# Patient Record
(demographics unavailable — no encounter records)

---

## 2024-10-16 NOTE — ASSESSMENT
[FreeTextEntry1] : Toe Nail Biopsy  Rx Hepatic Function Rx Cream for feet and solution for nails If biopsy positive and Liver test negative will Rx PO meds RTO 3 months - if no improvement will consider other PO meds and/or topical solutions

## 2024-10-16 NOTE — HISTORY OF PRESENT ILLNESS
[FreeTextEntry1] : Toe Nail fungus and tinea pedis  - long standing for over 1 year - seen a different podiatrist who Rx ketoconazole  - No improvement

## 2025-04-21 NOTE — END OF VISIT
[FreeTextEntry3] :  I spent 50 minutes caring for this patient, including time spent before the visit reviewing the chart, time spent during the visit, and time spent after the visit on documentation, excluding all procedures including ultrasounds.  [Time Spent: ___ minutes] : I have spent [unfilled] minutes of time on the encounter which excludes teaching and separately reported services.

## 2025-04-21 NOTE — PLAN
[FreeTextEntry1] : ASSESSMENT   Patients is a 20yo  @ 7w (by LMP c/w US today) who presents for medication  for pregnancy termination with history of anemia and UC.    Options Counseling: The patient was counseled about her pregnancy options of parenthood, adoption and . She expressed her sure decision for pregnancy termination.   The patient was counseled on medication vs procedural management and wishes to proceed with medication termination of pregnancy.   Risk of medication , including but not limited to: infection, retained products of conception, continuing pregnancy, hemorrhage, need for surgical  or D&C for incomplete  (~5%), potential for teratogenesis in this pregnancy if treatment unsuccessful, blood transfusion, need for further surgery. Discussed side effects, warning symptoms and pain management. Patient agreement forms were signed after discussion of risks and benefits of all management options.  Extensive bleeding and infection precautions were reviewed and written instructions were given to her.  Emergency contact information was provided.     PLAN   #Medication  protocol reviewed in detail.   Mifepristone 200 mg (see RN note) was administered orally in the clinic today after counseling and review of consent forms.  They were given prescriptions for misoprostol 800 mcg.    They were given prescriptions for ibuprofen 800 mg for pain as well as promethazine for nausea.  They were advised to take a dose of promethazine with their first dose of ibuprofen (before or at the time of taking misoprostol) to potentially help with pain.   #Labs: 10/16/24 Hg 11, O+   #STI screening: Patient was offered screening for sexually transmitted infections, declines today    #Contraception and Future Pregnancy Planning For contraception, they plan to discuss options, desires an option they do not have to think about every day, initial education provided, desires to continue options counseling at follow up   #Follow up: 1-2 weeks for in person visit, ultrasound

## 2025-04-21 NOTE — HISTORY OF PRESENT ILLNESS
[FreeTextEntry1] : REASON FOR VISIT: Termination of Pregnancy   HISTORY OF PRESENT ILLNESS   Patient is a 22yo  who presents for initial consultation for pregnancy termination.   Duration: LMP 25  [x] Patient has not yet had an ultrasound during this pregnancy [ ] Patient had an ultrasound during this pregnancy    Patient reports they care currently experiencing the following pregnancy symptoms: [ ] none [x] nausea [ ] abdominal/pelvic pain [ ] breast soreness [ ] vaginal bleeding [ ] Other:   The patient has told her partner about the pregnancy and  decision and has good support.  The patient is firm in their decision to proceed with termination. No one has pressured them into this decision.    __________________________________________________________________   MEDICATION  ELIGIBILITY SCREEN   Requirements: (Any "no" answers means not eligible)    IUP with gestational age < 77 days (11 weeks):  YES  Willing to undergo surgical  if medical  fails: YES Has an adult support person available after misoprostol administration: YES Has access to a phone at all times: YES Able and agrees to follow up plan: YES   Exclusions: (Any "yes" answer means may not be eligible)   Known allergy to mifepristone/misoprostol: NO  Current anticoagulant therapy: NO  Personal history of clotting disorder: NO  Current use of oral corticosteroids: NO  Chronic adrenal failure: NO  Personal history of porphyria: NO  IUD in place: NO  Anemia (hemoglobin = 9.5 g/dL) or symptoms/risk factors for anemia: NO  Heart disease with impaired function requiring medications: NO  Known large fibroid uterus: NO  Other serious medical problem: ULCERATIVE COLITIS  __________________________________________________________________   OBSTETRIC HISTORY:G1   Complications of prior pregnancies: N/A  History of Prior Transfusions: [  ] No [x] Yes - during UC Flair up     MEDICAL HISTORY: [ ] Asthma [ ] HTN [ ] h/o VTE [ ] recent hospitalizations [x] Other PMH: Iron deficiency Anemia (10/16/24 Hg 11.5, SEES HEME ONC), Ulcerative Colitis  (sees GI) [ ] Denies all PMH  SUBSTANCE USE HISTORY [x] Alcohol - socially  [ ] Cigarettes [ ] vaping [ ] marijuana [ ] opioid use or treatment [ ] other [ ] Denies history of current or present substance use    PSYCH HISTORY:  [x] Anxiety [ ] Depression  [ ] ADHD [ ] Other: [ ] Denies any history of suspected or treated psychiatric conditions    SURGICAL HISTORY:  Colonoscopy, EGD     MEDICATIONS: Rinvoq 30mg Daily  ALLERGIES AND REACTION: NKDA     SocHx: Lives with her mother and brother, feels safe at home, works in retail and goes to school, denies barriers to keeping appointment   TAUS: +GS, +YS, +CRL c/w 7w with cardiac activity

## 2025-05-13 NOTE — PLAN
[FreeTextEntry1] : TAUS: Retroverted uterus, thin endometrial stripe with some small amount of heterogeneous material, no retained GS, no ongoing pregnancy   A/P: 20yo  now s/p mediation , confirmed to be complete today on US and history, PMH of colitis, anemia.   s/p medication   - complete today on evaluation - post medication  education reviewed   Contraception: options reviewed, desires to start DMPA today  -verbal and written education provided  - Injection administered today by RN  - RTC in 12w for DMPA   Recommend annual exam to establish pap, routine screening

## 2025-05-13 NOTE — END OF VISIT
[FreeTextEntry3] :  I spent 30 minutes caring for this patient, including time spent before the visit reviewing the chart, time spent during the visit, and time spent after the visit on documentation, excluding all procedures including ultrasounds.

## 2025-05-13 NOTE — HISTORY OF PRESENT ILLNESS
[FreeTextEntry1] : Patient presenting today for follow up after medication .   She reports placing one dose of misoprostol: [x] Vaginally [ ] Buccally  They state bleeding started: 2 hours after insertion with passage of large clots And is now: resolved, no bleeding now  The cramping was: worse than a period, 10 out of 10 And is now: resolved   Before medication  they experienced the following pregnancy symptoms: breast tenderness, nausea, mood swings Now those symptoms have: [x] Resolved   [x] They have not yet resumed intercourse [ ] They have resumed intercourse   Reproductive life goals: Desires to delay pregnancy Contraception: education provided, desires to start DMPA today  Emergency contraception: declined

## 2025-06-04 NOTE — HISTORY OF PRESENT ILLNESS
[FreeTextEntry1] : Ms. ALE BARFIELD is a 21 year old female with a MHx of pan ulcerative colitis (diagnosed in , presenting symptoms of abdominal pain hematochezia and increased bowel movement frequency , prev trialed on IFX, VDZ both stopped due to primary non-responder, currently on UPA 30mg maintenance) in clinical remission presenting for follow up.   IBD Hx: beginning VNS trial in 2024. UPA initiated 2023 with improvement in abdominal pain and hematochezia. Had flare of symptoms 2023 requiring ED visit where CT demonstrated pancolitis for which she initiated prednisone 20mg prednisone taper with improvement in symptoms. 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 Given history of previous medication failures and patient describing resolution of abdominal pain and hematochezia, shared decision making discussion to continue UPA for now with VNS trial. Patient here today for 3 month follow up. Pt reports she is currently feeling better, on a good day BMs 5x per day. If bad day then 7-8 times per day. Has decreased from 10-15x per day. She used to wake up every hour in the middle of night. Now wakes up in middle of night maybe once. She recently ate something that bothered her and had bleeding one time when wiping. Otherwise, no blood in stool. She ate raw sushi a week ago and vomited afterwards. She denies abdominal pain. Still taking Rinvoq 30 MG. Did not notice a difference when was on the 45mg of Rinvoq. Currently doing VNS trial. States feels 75% better. Weight stable. Has 2 more weeks left of VNS trial. Steroids have been ineffective for her. Since previous evaluation 10/2023, patient describes continued resolution of abdominal pain and hematochezia. States she continues to have increased bowel movement frequency up to 10x daily. Most bowel movements are loose but not bloody or painful. States she will have urgent need to have bowel movement after she eats any food and now avoids eating on days that she needs to leave her home, which has had a significant impact on her quality of life. Denies new fever, nausea, vomiting, abdominal pain, melena, and hematochezia. Describes ongoing bilateral onychomycosis. Previously seen Dr. Dunne to establish care if need for surgical intervention in the future. Describes overall 50% improvement in overall wellbeing after initiation of UPA.  2024 -  [facility] IBD office - resolution in abdominal pain, hematochezia and decreased bowel movement frequency, repeat colonoscopy to assess mucosal healing  Interim Data: Hb 11.5  Today - recently underwent elective . outside of some GI upset when she acts lactose, she has no GI issues. She has 2-3 solid BMs daily without blood and urgency. she is fatigued unable to get through working some days. she is not interacting much with friends and is teary-eyed.

## 2025-06-04 NOTE — ASSESSMENT
[FreeTextEntry1] : Ms. ALE BARFIELD is a 21 year old female with a MHx of pan ulcerative colitis (diagnosed in , presenting symptoms of abdominal pain hematochezia and increased bowel movement frequency , prev trialed on IFX, VDZ both stopped due to primary non-responder, currently on UPA 30mg maintenance) in clinical remission presenting for follow up.  UC -dx , prev trialed on IFX, VDZ both stopped due to primary non-responder, currently on UPA 30mg maintenance in clinical remission (UPA started on 2023) -obtain safety labs -proceed with colonoscopy to assess mucosal healing  Fatigue/Depression -very fatigued unable to get through day but also teary-eyed during exam and concerned for depression, most likely fatigue is unrelated to IBD given clinical remission and most likely due to depression vs low thyroid vs other process -obtain TSH, obtain B12 -refer to psych  AR -hx of AR, seeing heme this week  Lactose Intolerance -struggles with what foods to avoid, seen by registered dietician, Lucina Boone RD  Elective  -recently underwent elective medical  as patient did not want to pursue pregnancy ( not related to fetal anomaly), discussed that she needs notify the IBD team of pregnancy as this may require medication adjustment and collaboration between treating physicians.    RTC: s/p colonoscopy   MD Keaton Rodriguez IBD Fellow [facility]   Discussed with Dr Ceballos

## 2025-06-04 NOTE — PHYSICAL EXAM
[Healthy Appearing] : healthy appearing [Sclera] : the sclera and conjunctiva were normal [Normal Appearance] : the appearance of the neck was normal [No Respiratory Distress] : no respiratory distress [Heart Rate And Rhythm] : heart rate was normal and rhythm regular [Abdomen Tenderness] : non-tender [Normal Color / Pigmentation] : normal skin color and pigmentation [de-identified] : sad, teary eyed

## 2025-06-04 NOTE — ASSESSMENT
[FreeTextEntry1] : Ms. ALE BARFIELD is a 21 year old female with a MHx of pan ulcerative colitis (diagnosed in , presenting symptoms of abdominal pain hematochezia and increased bowel movement frequency , prev trialed on IFX, VDZ both stopped due to primary non-responder, currently on UPA 30mg maintenance) in clinical remission presenting for follow up.  UC -dx , prev trialed on IFX, VDZ both stopped due to primary non-responder, currently on UPA 30mg maintenance in clinical remission (UPA started on 2023) -obtain safety labs -proceed with colonoscopy to assess mucosal healing  Fatigue/Depression -very fatigued unable to get through day but also teary-eyed during exam and concerned for depression, most likely fatigue is unrelated to IBD given clinical remission and most likely due to depression vs low thyroid vs other process -obtain TSH, obtain B12 -refer to psych  AR -hx of AR, seeing heme this week  Lactose Intolerance -struggles with what foods to avoid, seen by registered dietician, Lucina Boone RD  Elective  -recently underwent elective medical  as patient did not want to pursue pregnancy ( not related to fetal anomaly), discussed that she needs notify the IBD team of pregnancy as this may require medication adjustment and collaboration between treating physicians.    RTC: s/p colonoscopy   MD Keaton Rodriguez IBD Fellow [facility]   Discussed with Dr Ceballos

## 2025-06-04 NOTE — PHYSICAL EXAM
[Healthy Appearing] : healthy appearing [Sclera] : the sclera and conjunctiva were normal [Normal Appearance] : the appearance of the neck was normal [No Respiratory Distress] : no respiratory distress [Heart Rate And Rhythm] : heart rate was normal and rhythm regular [Abdomen Tenderness] : non-tender [Normal Color / Pigmentation] : normal skin color and pigmentation [de-identified] : sad, teary eyed

## 2025-06-04 NOTE — HISTORY OF PRESENT ILLNESS
[FreeTextEntry1] : Ms. ALE BARFIELD is a 21 year old female with a MHx of pan ulcerative colitis (diagnosed in , presenting symptoms of abdominal pain hematochezia and increased bowel movement frequency , prev trialed on IFX, VDZ both stopped due to primary non-responder, currently on UPA 30mg maintenance) in clinical remission presenting for follow up.   IBD Hx: beginning VNS trial in 2024. UPA initiated 2023 with improvement in abdominal pain and hematochezia. Had flare of symptoms 2023 requiring ED visit where CT demonstrated pancolitis for which she initiated prednisone 20mg prednisone taper with improvement in symptoms. 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 Given history of previous medication failures and patient describing resolution of abdominal pain and hematochezia, shared decision making discussion to continue UPA for now with VNS trial. Patient here today for 3 month follow up. Pt reports she is currently feeling better, on a good day BMs 5x per day. If bad day then 7-8 times per day. Has decreased from 10-15x per day. She used to wake up every hour in the middle of night. Now wakes up in middle of night maybe once. She recently ate something that bothered her and had bleeding one time when wiping. Otherwise, no blood in stool. She ate raw sushi a week ago and vomited afterwards. She denies abdominal pain. Still taking Rinvoq 30 MG. Did not notice a difference when was on the 45mg of Rinvoq. Currently doing VNS trial. States feels 75% better. Weight stable. Has 2 more weeks left of VNS trial. Steroids have been ineffective for her. Since previous evaluation 10/2023, patient describes continued resolution of abdominal pain and hematochezia. States she continues to have increased bowel movement frequency up to 10x daily. Most bowel movements are loose but not bloody or painful. States she will have urgent need to have bowel movement after she eats any food and now avoids eating on days that she needs to leave her home, which has had a significant impact on her quality of life. Denies new fever, nausea, vomiting, abdominal pain, melena, and hematochezia. Describes ongoing bilateral onychomycosis. Previously seen Dr. Dunne to establish care if need for surgical intervention in the future. Describes overall 50% improvement in overall wellbeing after initiation of UPA.  2024 -  [facility] IBD office - resolution in abdominal pain, hematochezia and decreased bowel movement frequency, repeat colonoscopy to assess mucosal healing  Interim Data: Hb 11.5  Today - recently underwent elective . outside of some GI upset when she acts lactose, she has no GI issues. She has 2-3 solid BMs daily without blood and urgency. she is fatigued unable to get through working some days. she is not interacting much with friends and is teary-eyed.

## 2025-06-06 NOTE — REVIEW OF SYSTEMS
[Fatigue] : fatigue [Ecchymoses] : ecchymoses [Bruising] : bruising  [Anemia] : anemia [Negative] : Allergic/Immunologic

## 2025-06-06 NOTE — REASON FOR VISIT
[Follow-Up Visit] : a follow-up visit for [Iron Deficiency Anemia] : iron deficiency anemia [Patient] : patient [Other: _____] : [unfilled]

## 2025-06-20 NOTE — PHYSICAL EXAM
[Pallor] : no pallor [Icterus] : not icterus [No focal deficits] : no focal deficits [Gait normal] : gait normal [Normal] : affect appropriate

## 2025-06-20 NOTE — REASON FOR VISIT
[Follow-Up Visit] : a follow-up visit for [Iron Deficiency Anemia] : iron deficiency anemia [Patient] : patient [Other: _____] : [unfilled] [FreeTextEntry2] : UC

## 2025-06-20 NOTE — CONSULT LETTER
[Dear  ___] : Dear  [unfilled], [Courtesy Letter:] : I had the pleasure of seeing your patient, [unfilled], in my office today. [Please see my note below.] : Please see my note below. [Consult Closing:] : Thank you very much for allowing me to participate in the care of this patient.  If you have any questions, please do not hesitate to contact me. [Sincerely,] : Sincerely, [FreeTextEntry3] : Deborah Lincoln DO Attending, Pediatric Hematology/Oncology [facility]

## 2025-06-20 NOTE — REVIEW OF SYSTEMS
[Fatigue] : fatigue [Petechiae] : no petechiae [Ecchymoses] : no ecchymoses [Icterus] : no icterus [Epistaxis] : no epistaxis [Bruising] : bruising  [Anemia] : anemia [Nausea] : no nausea [Constipation] : no constipation [Diarrhea] : no diarrhea [Melena] : no melena [Hematochezia] : no hematochezia [Negative] : Allergic/Immunologic [de-identified] : lower legs and forearms bruise easily [FreeTextEntry8] : ulcerative colitis